# Patient Record
Sex: MALE | Race: BLACK OR AFRICAN AMERICAN | NOT HISPANIC OR LATINO | URBAN - METROPOLITAN AREA
[De-identification: names, ages, dates, MRNs, and addresses within clinical notes are randomized per-mention and may not be internally consistent; named-entity substitution may affect disease eponyms.]

---

## 2019-12-24 ENCOUNTER — EMERGENCY (EMERGENCY)
Facility: HOSPITAL | Age: 59
LOS: 1 days | Discharge: HOME | End: 2019-12-24
Attending: EMERGENCY MEDICINE | Admitting: EMERGENCY MEDICINE
Payer: COMMERCIAL

## 2019-12-24 VITALS
TEMPERATURE: 99 F | SYSTOLIC BLOOD PRESSURE: 175 MMHG | HEART RATE: 74 BPM | DIASTOLIC BLOOD PRESSURE: 97 MMHG | OXYGEN SATURATION: 100 % | RESPIRATION RATE: 16 BRPM

## 2019-12-24 VITALS
OXYGEN SATURATION: 98 % | HEART RATE: 84 BPM | DIASTOLIC BLOOD PRESSURE: 71 MMHG | RESPIRATION RATE: 16 BRPM | TEMPERATURE: 98 F | SYSTOLIC BLOOD PRESSURE: 164 MMHG

## 2019-12-24 DIAGNOSIS — F32.1 MAJOR DEPRESSIVE DISORDER, SINGLE EPISODE, MODERATE: ICD-10-CM

## 2019-12-24 LAB
ALBUMIN SERPL ELPH-MCNC: 4.3 G/DL — SIGNIFICANT CHANGE UP (ref 3.3–5)
ALP SERPL-CCNC: 86 U/L — SIGNIFICANT CHANGE UP (ref 40–120)
ALT FLD-CCNC: 14 U/L — SIGNIFICANT CHANGE UP (ref 4–41)
ANION GAP SERPL CALC-SCNC: 13 MMO/L — SIGNIFICANT CHANGE UP (ref 7–14)
APPEARANCE UR: CLEAR — SIGNIFICANT CHANGE UP
AST SERPL-CCNC: 19 U/L — SIGNIFICANT CHANGE UP (ref 4–40)
BACTERIA # UR AUTO: NEGATIVE — SIGNIFICANT CHANGE UP
BASE EXCESS BLDV CALC-SCNC: 4.3 MMOL/L — SIGNIFICANT CHANGE UP
BASOPHILS # BLD AUTO: 0.05 K/UL — SIGNIFICANT CHANGE UP (ref 0–0.2)
BASOPHILS NFR BLD AUTO: 1.1 % — SIGNIFICANT CHANGE UP (ref 0–2)
BILIRUB SERPL-MCNC: 0.4 MG/DL — SIGNIFICANT CHANGE UP (ref 0.2–1.2)
BILIRUB UR-MCNC: NEGATIVE — SIGNIFICANT CHANGE UP
BLOOD GAS VENOUS - CREATININE: 0.99 MG/DL — SIGNIFICANT CHANGE UP (ref 0.5–1.3)
BLOOD UR QL VISUAL: NEGATIVE — SIGNIFICANT CHANGE UP
BUN SERPL-MCNC: 15 MG/DL — SIGNIFICANT CHANGE UP (ref 7–23)
CALCIUM SERPL-MCNC: 9.3 MG/DL — SIGNIFICANT CHANGE UP (ref 8.4–10.5)
CHLORIDE BLDV-SCNC: 99 MMOL/L — SIGNIFICANT CHANGE UP (ref 96–108)
CHLORIDE SERPL-SCNC: 98 MMOL/L — SIGNIFICANT CHANGE UP (ref 98–107)
CO2 SERPL-SCNC: 23 MMOL/L — SIGNIFICANT CHANGE UP (ref 22–31)
COLOR SPEC: SIGNIFICANT CHANGE UP
CREAT SERPL-MCNC: 1.04 MG/DL — SIGNIFICANT CHANGE UP (ref 0.5–1.3)
EOSINOPHIL # BLD AUTO: 0.17 K/UL — SIGNIFICANT CHANGE UP (ref 0–0.5)
EOSINOPHIL NFR BLD AUTO: 3.8 % — SIGNIFICANT CHANGE UP (ref 0–6)
GAS PNL BLDV: 135 MMOL/L — LOW (ref 136–146)
GLUCOSE BLDV-MCNC: 202 MG/DL — HIGH (ref 70–99)
GLUCOSE SERPL-MCNC: 206 MG/DL — HIGH (ref 70–99)
GLUCOSE UR-MCNC: 300 — HIGH
HCO3 BLDV-SCNC: 26 MMOL/L — SIGNIFICANT CHANGE UP (ref 20–27)
HCT VFR BLD CALC: 45.6 % — SIGNIFICANT CHANGE UP (ref 39–50)
HCT VFR BLDV CALC: 44.9 % — SIGNIFICANT CHANGE UP (ref 39–51)
HGB BLD-MCNC: 14.4 G/DL — SIGNIFICANT CHANGE UP (ref 13–17)
HGB BLDV-MCNC: 14.7 G/DL — SIGNIFICANT CHANGE UP (ref 13–17)
HYALINE CASTS # UR AUTO: NEGATIVE — SIGNIFICANT CHANGE UP
IMM GRANULOCYTES NFR BLD AUTO: 0.2 % — SIGNIFICANT CHANGE UP (ref 0–1.5)
KETONES UR-MCNC: SIGNIFICANT CHANGE UP
LACTATE BLDV-MCNC: 1.8 MMOL/L — SIGNIFICANT CHANGE UP (ref 0.5–2)
LEUKOCYTE ESTERASE UR-ACNC: NEGATIVE — SIGNIFICANT CHANGE UP
LYMPHOCYTES # BLD AUTO: 0.8 K/UL — LOW (ref 1–3.3)
LYMPHOCYTES # BLD AUTO: 17.9 % — SIGNIFICANT CHANGE UP (ref 13–44)
MCHC RBC-ENTMCNC: 23.7 PG — LOW (ref 27–34)
MCHC RBC-ENTMCNC: 31.6 % — LOW (ref 32–36)
MCV RBC AUTO: 75.1 FL — LOW (ref 80–100)
MONOCYTES # BLD AUTO: 0.52 K/UL — SIGNIFICANT CHANGE UP (ref 0–0.9)
MONOCYTES NFR BLD AUTO: 11.7 % — SIGNIFICANT CHANGE UP (ref 2–14)
NEUTROPHILS # BLD AUTO: 2.91 K/UL — SIGNIFICANT CHANGE UP (ref 1.8–7.4)
NEUTROPHILS NFR BLD AUTO: 65.3 % — SIGNIFICANT CHANGE UP (ref 43–77)
NITRITE UR-MCNC: NEGATIVE — SIGNIFICANT CHANGE UP
NRBC # FLD: 0 K/UL — SIGNIFICANT CHANGE UP (ref 0–0)
PCO2 BLDV: 50 MMHG — SIGNIFICANT CHANGE UP (ref 41–51)
PH BLDV: 7.38 PH — SIGNIFICANT CHANGE UP (ref 7.32–7.43)
PH UR: 6 — SIGNIFICANT CHANGE UP (ref 5–8)
PLATELET # BLD AUTO: 243 K/UL — SIGNIFICANT CHANGE UP (ref 150–400)
PMV BLD: 10.1 FL — SIGNIFICANT CHANGE UP (ref 7–13)
PO2 BLDV: 25 MMHG — LOW (ref 35–40)
POTASSIUM BLDV-SCNC: 4 MMOL/L — SIGNIFICANT CHANGE UP (ref 3.4–4.5)
POTASSIUM SERPL-MCNC: 4.2 MMOL/L — SIGNIFICANT CHANGE UP (ref 3.5–5.3)
POTASSIUM SERPL-SCNC: 4.2 MMOL/L — SIGNIFICANT CHANGE UP (ref 3.5–5.3)
PROT SERPL-MCNC: 7.3 G/DL — SIGNIFICANT CHANGE UP (ref 6–8.3)
PROT UR-MCNC: 100 — HIGH
RBC # BLD: 6.07 M/UL — HIGH (ref 4.2–5.8)
RBC # FLD: 13.8 % — SIGNIFICANT CHANGE UP (ref 10.3–14.5)
RBC CASTS # UR COMP ASSIST: SIGNIFICANT CHANGE UP (ref 0–?)
SAO2 % BLDV: 38.8 % — LOW (ref 60–85)
SODIUM SERPL-SCNC: 134 MMOL/L — LOW (ref 135–145)
SP GR SPEC: 1.02 — SIGNIFICANT CHANGE UP (ref 1–1.04)
SQUAMOUS # UR AUTO: SIGNIFICANT CHANGE UP
UROBILINOGEN FLD QL: NORMAL — SIGNIFICANT CHANGE UP
WBC # BLD: 4.46 K/UL — SIGNIFICANT CHANGE UP (ref 3.8–10.5)
WBC # FLD AUTO: 4.46 K/UL — SIGNIFICANT CHANGE UP (ref 3.8–10.5)
WBC UR QL: SIGNIFICANT CHANGE UP (ref 0–?)

## 2019-12-24 PROCEDURE — 71046 X-RAY EXAM CHEST 2 VIEWS: CPT | Mod: 26

## 2019-12-24 PROCEDURE — 99283 EMERGENCY DEPT VISIT LOW MDM: CPT

## 2019-12-24 PROCEDURE — 90792 PSYCH DIAG EVAL W/MED SRVCS: CPT

## 2019-12-24 PROCEDURE — 70450 CT HEAD/BRAIN W/O DYE: CPT | Mod: 26

## 2019-12-24 NOTE — ED BEHAVIORAL HEALTH ASSESSMENT NOTE - RISK ASSESSMENT
Protective factors include no suicide attempts, no violence history, medication compliance, no access to guns, no substance abuse, supportive family, willingness to seek help, no suicidal ideation or homicidal ideation, identifies reasons for living.   Risk factors include family history of suicide, family history of severe mental illness.  Pt is not at acutely elevated risk of harm to self or others. Low Acute Suicide Risk

## 2019-12-24 NOTE — ED BEHAVIORAL HEALTH ASSESSMENT NOTE - DETAILS
none sister committed suicide in 2013; one of his brothers lives on [place] [address] informed wife and brother

## 2019-12-24 NOTE — ED PROVIDER NOTE - ATTENDING CONTRIBUTION TO CARE
agree with resident note    "60 yo m pmh hld, niddm, pw ams. pw wife who provides collateral. states over last few days pt has not seemed himself and has been "lethargic". reports today pt was supposed to get dressed for event however took 2 hours and then came down still in robe unaware of having to go anywhere or get dressed. pt reports he does not "feel well". unable to quantify beyond that. reports dysuria. denies f/c, n/v, ha, trauma."  On further questioning wife states pt recently voluntarily retired from [employer], is normally vigorous.  Now sleeps all day, no pleasure in anything    PE: depressed; low amplitude of voice with 1 word answers; VSS; CTAB/L; s1 s2 no m/r/g abd soft/NT/ND ext: no edema Neuro: CNs intact 5/5 motor UE and LE; sensation intact    Imp: depression will r/o medical causes with labs and head CT given abrupt nature; if negative consult psychiatry

## 2019-12-24 NOTE — ED BEHAVIORAL HEALTH ASSESSMENT NOTE - SUMMARY
60 y/o male, , former [employer] employee, retired in Sep 2019, no formal psych history, no past psych hospitalizations, no h/o outpatient treatment, no h/o self-injurious behavior or suicide attempts, no h/o violence or legal issues, PMH Type II Diabetes, HTN, no h/o substance abuse, brought in by wife and brother for depression.  Patient denies any suicidal ideation, intent, or plan. He is not manic or psychotic. He does not present an acute danger to self or others and does not meet criteria for involuntary psychiatric admission at this time. Pt declines voluntary psychiatric admission at this time.

## 2019-12-24 NOTE — ED ADULT TRIAGE NOTE - CHIEF COMPLAINT QUOTE
Pt family states that he has not been "acting right" x 3 days, pt family states that pt walking and talking slowly.  Pt offers no complaint.  Past medical history: HTN, HLD  FS in EMS

## 2019-12-24 NOTE — ED BEHAVIORAL HEALTH ASSESSMENT NOTE - DESCRIPTION
calm, cooperative, in good behavioral control    Vital Signs Last 24 Hrs  T(C): 37.2 (24 Dec 2019 12:40), Max: 37.2 (24 Dec 2019 12:40)  T(F): 99 (24 Dec 2019 12:40), Max: 99 (24 Dec 2019 12:40)  HR: 74 (24 Dec 2019 12:40) (74 - 84)  BP: 175/97 (24 Dec 2019 12:40) (164/71 - 175/97)  BP(mean): --  RR: 16 (24 Dec 2019 12:40) (16 - 16)  SpO2: 100% (24 Dec 2019 12:40) (98% - 100%) Type II Diabetes, HTN see HPI

## 2019-12-24 NOTE — ED PROVIDER NOTE - NSFOLLOWUPINSTRUCTIONS_ED_ALL_ED_FT
1) Please follow up with your Primary Care Provider in 24-48 hours  2) Seek immediate medical care for any new or returning symptoms including but not limited high fevers, difficulty breathing  3) Please follow up at Crisis Center

## 2019-12-24 NOTE — ED BEHAVIORAL HEALTH ASSESSMENT NOTE - SUICIDE PROTECTIVE FACTORS
Identifies reasons for living/Has future plans/Responsibility to family and others/Supportive social network of family or friends/Ability to cope with stress

## 2019-12-24 NOTE — ED BEHAVIORAL HEALTH ASSESSMENT NOTE - HPI (INCLUDE ILLNESS QUALITY, SEVERITY, DURATION, TIMING, CONTEXT, MODIFYING FACTORS, ASSOCIATED SIGNS AND SYMPTOMS)
60 y/o male, , former [employer] employee, retired in Sep 2019, no formal psych history, no past psych hospitalizations, no h/o outpatient treatment, no h/o self-injurious behavior or suicide attempts, no h/o violence or legal issues, PMH Type II Diabetes, HTN, no h/o substance abuse, brought in by wife and brother for depression.    On assessment, pt reports feeling depressed for the past few months. He cites assisted and his brother's mental illness as stressors. Pt states his brother resides on [place] grounds. States brother's illness "weighs on me" more now that he isn't distracted by work. Pt feels helpless, wishes he could do more for his brother.   Pt reports more difficulty sleeping, slightly reduced appetite, lower energy. He denies difficulty concentrating. Denies anhedonia, stating he enjoys watching sports. He denies hopelessness. He denies suicidal ideation, intent, or plan. He denies manic or psychotic symptoms. He denies homicidal or violent ideation, intent, or plan. He reports having 2-3 alcoholic drinks per week. States he last drank a few days ago. He denies drug use.   See  note for collateral.

## 2019-12-24 NOTE — ED PROVIDER NOTE - CLINICAL SUMMARY MEDICAL DECISION MAKING FREE TEXT BOX
58 yo m pw 3day hx ams. pt unable to recall three words within a few minutes. will check rectal temperature. cxr. labs. cth. reassess.

## 2019-12-24 NOTE — ED BEHAVIORAL HEALTH ASSESSMENT NOTE - SUICIDE RISK FACTORS
Family History of psychiatric diagnoses requiring hospitalization/Insomnia/Current mood episode/Family history of suicide

## 2019-12-24 NOTE — ED BEHAVIORAL HEALTH NOTE - BEHAVIORAL HEALTH NOTE
Worker met with pt, pt's wife, Rika Swartz) [phone], and pt's brother, Jame, in the Main ED to obtain collateral.      Pt is a 59-year-old male BIB his wife and brother, Jame, who is very close with.  Pt domiciles with his wife and several of their adopted children.  Pt retired from the [employer] in 2019 and approximately a month after retiring pt began spending most of the day in bed, often not getting out of bed until 3p or 4p.  Pt is eating daily but not like he normally does.  Pt maintains proper daily hygeine.  Pt reports some trouble falling asleep but that once he does he does not want to get out of bed when he wakes up.  Pt and pt's wife stated pt was very much looking forward to prison but that it is not what he expected and he feels very down.  Pt's brother stated they have another brother and had 3 sisters.  One of the sisters  by suicide in  following a long bout of depression.  Pt's brother, Samir, lives at [facility] after losing his job with [employer] and is severely depressed.  Pt's wife stated that the brother residing at [place] is so depressed he won't come out to see them when they drive there to visit.  Pt and his wife lost 2 of their children, one when she was 8-years old and one when she was 35-year old.  Pt's mother  of cancer within a week of his 8 year-old daughter's death.    Pt reports no SI or plan current or previously, no HI, no AH or VH.  Pt and family stated there are no weapons in their home.  Pt's face lit up when asked what his sports teams are and he stated the [employer].  Pt is a diabetic, non insulin dependent, he takes metformin and a medication for high cholesterol that he could not remember the name of.  Pt has never received a psychiatric evaluation or diagnosis, and will begin seeing a new primary care physician in the next few months as his previous PCP retired.  Pt stated he has felt down like this a few times in his life but not like this or this bad.  Pt reported the holidays have an effect on his mood but that it's primarily been due to prison. Worker met with pt, pt's wife, Rika Swartz) [phone], and pt's brother, Jame, in the Main ED to obtain collateral.      Pt is a 59-year-old male BIB EMS with his wife and brother, Jame, who is very close with.  Pt domiciles with his wife and several of their adopted children.  Pt retired from the [employer] in 2019 and approximately a month after retiring pt began spending most of the day in bed, often not getting out of bed until 3p or 4p.  Pt is eating daily but not like he normally does.  Pt maintains proper daily hygeine.  Pt reports some trouble falling asleep but that once he does he does not want to get out of bed when he wakes up.  Pt and pt's wife stated pt was very much looking forward to alf but that it is not what he expected and he feels very down.  Pt's brother stated they have another brother and had 3 sisters.  One of the sisters  by suicide in  following a long bout of depression.  Pt's brother, Samir, lives at [facility] after losing his job with [employer] and is severely depressed.  Pt's wife stated that the brother residing at [place] is so depressed he won't come out to see them when they drive there to visit.  Pt and his wife lost 2 of their children, one when she was 8-years old and one when she was 35-year old.  Pt's mother  of cancer within a week of his 8 year-old daughter's death.    Pt reports no SI or plan current or previously, no HI, no AH or VH.  Pt and family stated there are no weapons in their home.  Pt's face lit up when asked what his sports teams are and he stated the [employer].  Pt is a diabetic, non insulin dependent, he takes metformin and a medication for high cholesterol that he could not remember the name of.  Pt has never received a psychiatric evaluation or diagnosis, and will begin seeing a new primary care physician in the next few months as his previous PCP retired.  Pt stated he has felt down like this a few times in his life but not like this or this bad.  Pt reported the holidays have an effect on his mood but that it's primarily been due to alf.

## 2019-12-24 NOTE — ED ADULT NURSE NOTE - OBJECTIVE STATEMENT
Pt rec'd to ED R#19, brought in by wife for AMS. Pt states "I just don't feel like myself." Wife states he is not acting appropriately, seems lethargic. Of note, pt retired 4 months ago. As per wife patient has not been active, has been staying in bed all day. Pt appears withdrawn with flat affect. Denies SI/HI, however says he does feel "down." Denies any toxic habits including illicit drug use/ ETOH/ cigarette smoking. Denies any medical complaints including CP/ SOB/ fevers/ chills/ N/V/D/ dizziness/ other acute complaints. Is in NAD, respirations even and unlabored, VSS. EKG completed upon arrival to room. IV inserted, BW collected and sent to lab. Bed in lowest locked position, call bell within reach. Wife at bedside.

## 2019-12-24 NOTE — ED PROVIDER NOTE - NS ED ROS FT
GENERAL: No fever or chills, //             EYES: no change in vision, //             HEENT: no trouble swallowing or speaking, //             CARDIAC: no chest pain, //              PULMONARY: no cough or SOB, //             GI: no abdominal pain, no nausea or no vomiting, no diarrhea or constipation, //             : dysuria ,  //            SKIN: no rashes,  //            NEURO: no headache,  //             MSK: No joint pain otherwise as HPI or negative. ~Bhupinder Schneider DO PGY2

## 2019-12-24 NOTE — ED PROVIDER NOTE - PATIENT PORTAL LINK FT
no You can access the FollowMyHealth Patient Portal offered by [facility] by registering at the following website: [email or web address]. By joining [employer]’s FollowMyHealth portal, you will also be able to view your health information using other applications (apps) compatible with our system.

## 2019-12-24 NOTE — ED BEHAVIORAL HEALTH ASSESSMENT NOTE - OTHER
wife and brother did not assess dysphoric overall constricted but affect brightens when talking about sports see HPI

## 2019-12-24 NOTE — ED PROVIDER NOTE - OBJECTIVE STATEMENT
60 yo m pmh hld, niddm, pw ams. pw wife who provides collateral. states over last few days pt has not seemed himself and has been "lethargic". reports today pt was supposed to get dressed for event however took 2 hours and then came down still in robe unaware of having to go anywhere or get dressed. pt reports he does not "feel well". unable to quantify beyond that. reports dysuria. denies f/c, n/v, ha, trauma.

## 2019-12-26 LAB
BACTERIA UR CULT: SIGNIFICANT CHANGE UP
SPECIMEN SOURCE: SIGNIFICANT CHANGE UP

## 2019-12-29 ENCOUNTER — EMERGENCY (EMERGENCY)
Facility: HOSPITAL | Age: 59
LOS: 1 days | Discharge: HOME | End: 2019-12-29
Admitting: EMERGENCY MEDICINE
Payer: COMMERCIAL

## 2019-12-29 VITALS
HEART RATE: 81 BPM | RESPIRATION RATE: 16 BRPM | SYSTOLIC BLOOD PRESSURE: 163 MMHG | TEMPERATURE: 99 F | DIASTOLIC BLOOD PRESSURE: 99 MMHG | OXYGEN SATURATION: 100 %

## 2019-12-29 VITALS
SYSTOLIC BLOOD PRESSURE: 155 MMHG | DIASTOLIC BLOOD PRESSURE: 112 MMHG | TEMPERATURE: 99 F | OXYGEN SATURATION: 97 % | RESPIRATION RATE: 18 BRPM | HEART RATE: 89 BPM

## 2019-12-29 LAB
ALBUMIN SERPL ELPH-MCNC: 4.8 G/DL — SIGNIFICANT CHANGE UP (ref 3.3–5)
ALP SERPL-CCNC: 96 U/L — SIGNIFICANT CHANGE UP (ref 40–120)
ALT FLD-CCNC: 16 U/L — SIGNIFICANT CHANGE UP (ref 4–41)
AMPHET UR-MCNC: NEGATIVE — SIGNIFICANT CHANGE UP
ANION GAP SERPL CALC-SCNC: 16 MMO/L — HIGH (ref 7–14)
APAP SERPL-MCNC: < 15 UG/ML — LOW (ref 15–25)
APPEARANCE UR: CLEAR — SIGNIFICANT CHANGE UP
AST SERPL-CCNC: 15 U/L — SIGNIFICANT CHANGE UP (ref 4–40)
BACTERIA # UR AUTO: NEGATIVE — SIGNIFICANT CHANGE UP
BARBITURATES UR SCN-MCNC: NEGATIVE — SIGNIFICANT CHANGE UP
BASOPHILS # BLD AUTO: 0.08 K/UL — SIGNIFICANT CHANGE UP (ref 0–0.2)
BASOPHILS NFR BLD AUTO: 1.7 % — SIGNIFICANT CHANGE UP (ref 0–2)
BENZODIAZ UR-MCNC: NEGATIVE — SIGNIFICANT CHANGE UP
BILIRUB SERPL-MCNC: 0.4 MG/DL — SIGNIFICANT CHANGE UP (ref 0.2–1.2)
BILIRUB UR-MCNC: NEGATIVE — SIGNIFICANT CHANGE UP
BLOOD UR QL VISUAL: NEGATIVE — SIGNIFICANT CHANGE UP
BUN SERPL-MCNC: 22 MG/DL — SIGNIFICANT CHANGE UP (ref 7–23)
CALCIUM SERPL-MCNC: 9.7 MG/DL — SIGNIFICANT CHANGE UP (ref 8.4–10.5)
CANNABINOIDS UR-MCNC: NEGATIVE — SIGNIFICANT CHANGE UP
CHLORIDE SERPL-SCNC: 97 MMOL/L — LOW (ref 98–107)
CO2 SERPL-SCNC: 25 MMOL/L — SIGNIFICANT CHANGE UP (ref 22–31)
COCAINE METAB.OTHER UR-MCNC: NEGATIVE — SIGNIFICANT CHANGE UP
COLOR SPEC: YELLOW — SIGNIFICANT CHANGE UP
CREAT SERPL-MCNC: 1.1 MG/DL — SIGNIFICANT CHANGE UP (ref 0.5–1.3)
EOSINOPHIL # BLD AUTO: 0.23 K/UL — SIGNIFICANT CHANGE UP (ref 0–0.5)
EOSINOPHIL NFR BLD AUTO: 5 % — SIGNIFICANT CHANGE UP (ref 0–6)
ETHANOL BLD-MCNC: < 10 MG/DL — SIGNIFICANT CHANGE UP
GLUCOSE SERPL-MCNC: 156 MG/DL — HIGH (ref 70–99)
GLUCOSE UR-MCNC: NEGATIVE — SIGNIFICANT CHANGE UP
HCT VFR BLD CALC: 47.2 % — SIGNIFICANT CHANGE UP (ref 39–50)
HGB BLD-MCNC: 15.2 G/DL — SIGNIFICANT CHANGE UP (ref 13–17)
HYALINE CASTS # UR AUTO: NEGATIVE — SIGNIFICANT CHANGE UP
IMM GRANULOCYTES NFR BLD AUTO: 0.2 % — SIGNIFICANT CHANGE UP (ref 0–1.5)
KETONES UR-MCNC: SIGNIFICANT CHANGE UP
LEUKOCYTE ESTERASE UR-ACNC: NEGATIVE — SIGNIFICANT CHANGE UP
LYMPHOCYTES # BLD AUTO: 1.14 K/UL — SIGNIFICANT CHANGE UP (ref 1–3.3)
LYMPHOCYTES # BLD AUTO: 24.9 % — SIGNIFICANT CHANGE UP (ref 13–44)
MCHC RBC-ENTMCNC: 24.1 PG — LOW (ref 27–34)
MCHC RBC-ENTMCNC: 32.2 % — SIGNIFICANT CHANGE UP (ref 32–36)
MCV RBC AUTO: 74.9 FL — LOW (ref 80–100)
METHADONE UR-MCNC: NEGATIVE — SIGNIFICANT CHANGE UP
MONOCYTES # BLD AUTO: 0.49 K/UL — SIGNIFICANT CHANGE UP (ref 0–0.9)
MONOCYTES NFR BLD AUTO: 10.7 % — SIGNIFICANT CHANGE UP (ref 2–14)
NEUTROPHILS # BLD AUTO: 2.63 K/UL — SIGNIFICANT CHANGE UP (ref 1.8–7.4)
NEUTROPHILS NFR BLD AUTO: 57.5 % — SIGNIFICANT CHANGE UP (ref 43–77)
NITRITE UR-MCNC: NEGATIVE — SIGNIFICANT CHANGE UP
NRBC # FLD: 0 K/UL — SIGNIFICANT CHANGE UP (ref 0–0)
OPIATES UR-MCNC: NEGATIVE — SIGNIFICANT CHANGE UP
OXYCODONE UR-MCNC: NEGATIVE — SIGNIFICANT CHANGE UP
PCP UR-MCNC: NEGATIVE — SIGNIFICANT CHANGE UP
PH UR: 6 — SIGNIFICANT CHANGE UP (ref 5–8)
PLATELET # BLD AUTO: 234 K/UL — SIGNIFICANT CHANGE UP (ref 150–400)
PMV BLD: 10.3 FL — SIGNIFICANT CHANGE UP (ref 7–13)
POTASSIUM SERPL-MCNC: 4.3 MMOL/L — SIGNIFICANT CHANGE UP (ref 3.5–5.3)
POTASSIUM SERPL-SCNC: 4.3 MMOL/L — SIGNIFICANT CHANGE UP (ref 3.5–5.3)
PROT SERPL-MCNC: 7.6 G/DL — SIGNIFICANT CHANGE UP (ref 6–8.3)
PROT UR-MCNC: 200 — HIGH
RBC # BLD: 6.3 M/UL — HIGH (ref 4.2–5.8)
RBC # FLD: 13.5 % — SIGNIFICANT CHANGE UP (ref 10.3–14.5)
RBC CASTS # UR COMP ASSIST: SIGNIFICANT CHANGE UP (ref 0–?)
SALICYLATES SERPL-MCNC: < 5 MG/DL — LOW (ref 15–30)
SODIUM SERPL-SCNC: 138 MMOL/L — SIGNIFICANT CHANGE UP (ref 135–145)
SP GR SPEC: 1.03 — SIGNIFICANT CHANGE UP (ref 1–1.04)
SQUAMOUS # UR AUTO: SIGNIFICANT CHANGE UP
TSH SERPL-MCNC: 0.89 UIU/ML — SIGNIFICANT CHANGE UP (ref 0.27–4.2)
UROBILINOGEN FLD QL: NORMAL — SIGNIFICANT CHANGE UP
WBC # BLD: 4.58 K/UL — SIGNIFICANT CHANGE UP (ref 3.8–10.5)
WBC # FLD AUTO: 4.58 K/UL — SIGNIFICANT CHANGE UP (ref 3.8–10.5)
WBC UR QL: HIGH (ref 0–?)

## 2019-12-29 PROCEDURE — 90792 PSYCH DIAG EVAL W/MED SRVCS: CPT | Mod: GC

## 2019-12-29 PROCEDURE — 99284 EMERGENCY DEPT VISIT MOD MDM: CPT

## 2019-12-29 RX ORDER — HYDROXYZINE HCL 10 MG
1 TABLET ORAL
Qty: 21 | Refills: 0
Start: 2019-12-29 | End: 2020-01-04

## 2019-12-29 NOTE — ED ADULT NURSE REASSESSMENT NOTE - NS ED NURSE REASSESS COMMENT FT1
pt calm lying on stretcher in nad denies si/hi/avh presently, pt awaiting bed assignment safety & precaution maintained eval on going.

## 2019-12-29 NOTE — ED BEHAVIORAL HEALTH ASSESSMENT NOTE - OTHER
wife and brother did not assess dysphoric overall constricted but affect brightens when talking about sports see HPI guarded psychomotor retardation

## 2019-12-29 NOTE — ED BEHAVIORAL HEALTH ASSESSMENT NOTE - DESCRIPTION
calm, cooperative, in good behavioral control    Vital Signs Last 24 Hrs  T(C): 37.2 (24 Dec 2019 12:40), Max: 37.2 (24 Dec 2019 12:40)  T(F): 99 (24 Dec 2019 12:40), Max: 99 (24 Dec 2019 12:40)  HR: 74 (24 Dec 2019 12:40) (74 - 84)  BP: 175/97 (24 Dec 2019 12:40) (164/71 - 175/97)  BP(mean): --  RR: 16 (24 Dec 2019 12:40) (16 - 16)  SpO2: 100% (24 Dec 2019 12:40) (98% - 100%) Type II Diabetes, HTN see HPI calm, cooperative but guarded, in good behavioral control    T(C): 37.1 (12-29-19 @ 16:40), Max: 37.1 (12-29-19 @ 12:19)  HR: 81 (12-29-19 @ 16:40) (81 - 89)  BP: 163/99 (12-29-19 @ 16:40) (155/112 - 163/99)  RR: 16 (12-29-19 @ 16:40) (16 - 18)  SpO2: 100% (12-29-19 @ 16:40) (97% - 100%)  Wt(kg): --

## 2019-12-29 NOTE — ED BEHAVIORAL HEALTH ASSESSMENT NOTE - SAFETY PLAN DETAILS
call 911 or return to ED if symptoms worsen or if pt develops thoughts of harming self or others call 911 or return to ED/crisis center if symptoms worsen or if pt develops thoughts of harming self or others

## 2019-12-29 NOTE — ED PROVIDER NOTE - PATIENT PORTAL LINK FT
You can access the FollowMyHealth Patient Portal offered by [facility] by registering at the following website: [email or web address]. By joining [employer]’s FollowMyHealth portal, you will also be able to view your health information using other applications (apps) compatible with our system.

## 2019-12-29 NOTE — ED BEHAVIORAL HEALTH ASSESSMENT NOTE - CASE SUMMARY
59/M with no established past psych hx, no prior psych hosps, no hx of SA or self injurious behavior and no substance abuse hx.  Last seen at the [facility] ED x 5 days ago due to depression.  Subsequently discharged during that visit and referred to Crisis Center.  Today, came back for the same complaint accompanied by family.  Pt endorses feeling depression for the past few months.  This has been accompanied by early insomnia, with appetite disturbances (no consequential wt loss), low energy level and guilty ruminations (but would not expound when attempt to explore on it was made).  Pt denied anhedonia; denied feeling hopelessness/helpless/worthless. Collateral information was obtained from the Pt's wife and daughter who both reported that Pt has been feeling depressed with anxiety since retiring from his job at the [employer] since 9/2019.  This has been compounded by other stressors like his brother's severe depression.  Pt has recently revealed "something to his wife" that made him feel guilty - regarding not disclosing to her earlier about the event that transpired in his life.  Since his  ED arrival, the Pt has been calm and cooperative.  There has been no agitation/aggressive behavior.  No verbalization of active/ passive SI/HI.   There are no signs/symptoms of acute vernon or florid psychosis.  The Pt is not experiencing any AH/VH.  Pt is not showing any signs/symptoms of intoxication or withdrawal.  He has not tested limits.. Has maintained appropriate boundaries. Pt has been easily redirected.  Overall, there has been no management issues.  Family did not express any safety concerns.  They are ok with Pt being discharged and will follow up as OP basis.      RECOMMENDATIONS:   1. Psychoeducation provided.  May give atarax 10mg q6-q8hrs PRN for anxiety/agitation.  Otherwise, discussed role of starting an antidepressant like an SSRI should symptoms worsen as well as exploring on prospect of Pt engaging in psychotherapy.  Pt expressed that he is open to this once he is able to establish an out Pt psychiatry clinic follow up.    2. Emergency protocol reviewed.  Pt and family were both adviced to call 911 or come to the nearest ED should symptoms worsen; have increasing bouts of agitation/ aggressive behavior; having SI/HI.

## 2019-12-29 NOTE — ED BEHAVIORAL HEALTH ASSESSMENT NOTE - MODIFICATIONS
I have examined and evaluated the patient with Dr BOOGIE Blackwell and performed alexis elements of the History and Mental Status Examination.  I agree with her assessment and recommendations.

## 2019-12-29 NOTE — ED PROVIDER NOTE - CLINICAL SUMMARY MEDICAL DECISION MAKING FREE TEXT BOX
Labs, Urine Tox/UA, EKG  Medical evaluation performed. There is no clinical evidence of intoxication or any acute medical problem requiring immediate intervention. Patient is awaiting psychiatric consultation. Final disposition will be determined by psychiatrist. 58 y/o M hx DM, Depression   Labs, Urine Tox/UA, EKG  Medical evaluation performed. There is no clinical evidence of intoxication or any acute medical problem requiring immediate intervention. Patient is awaiting psychiatric consultation. Final disposition will be determined by psychiatrist.

## 2019-12-29 NOTE — ED BEHAVIORAL HEALTH ASSESSMENT NOTE - SUICIDE RISK FACTORS
Current mood episode/Family history of suicide/Family History of psychiatric diagnoses requiring hospitalization/Insomnia

## 2019-12-29 NOTE — ED BEHAVIORAL HEALTH ASSESSMENT NOTE - SUICIDE PROTECTIVE FACTORS
Has future plans/Responsibility to family and others/Ability to cope with stress/Identifies reasons for living/Supportive social network of family or friends

## 2019-12-29 NOTE — ED BEHAVIORAL HEALTH ASSESSMENT NOTE - DETAILS
none sister committed suicide in 2013; one of his brothers lives on [place] grounds informed wife and brother sister committed suicide in 2013; one of his brothers lives on [place] grounds and has severe depression

## 2019-12-29 NOTE — ED PROVIDER NOTE - OBJECTIVE STATEMENT
60 y/o M hx DM, Depression BIB family w c/o  worsening depression .  Patient admits to feeling sad.  Explicitly denies SI/HI/AH/VH. Denies falling,  punching, or kicking any objects.   Denies pain, SOB, fever, chills,  chest/abdominal discomfort. Denies use of  alcohol or  illicit drugs. No evidence of physical injuries, broken  skin or deformities.

## 2019-12-29 NOTE — ED BEHAVIORAL HEALTH ASSESSMENT NOTE - SUMMARY
58 y/o male, , former [employer] employee, retired in Sep 2019, no formal psych history, no past psych hospitalizations, no h/o outpatient treatment, no h/o self-injurious behavior or suicide attempts, no h/o violence or legal issues, PMH Type II Diabetes, HTN, no h/o substance abuse, brought in by wife and brother for depression.  Patient denies any suicidal ideation, intent, or plan. He is not manic or psychotic. He does not present an acute danger to self or others and does not meet criteria for involuntary psychiatric admission at this time. Pt declines voluntary psychiatric admission at this time. 60 y/o male, , former [employer] employee, retired in Sep 2019, no formal psych history, no past psych hospitalizations, no h/o outpatient treatment, no h/o self-injurious behavior or suicide attempts, no h/o violence or legal issues, PMH Type II Diabetes, HTN, no h/o substance abuse, came to ED requesting psych eval, accompanied by wife/daughter. Of note, pt was seen in ED 12/24/19 for similar complaint.    Pt had panic attack today in the context of depression. No safety concerns from pt or family. Pt plans to engage in outpt tx.  Patient denies any suicidal ideation, intent, or plan. He is not manic or psychotic. He does not present an acute danger to self or others and does not meet criteria for involuntary psychiatric admission at this time. Pt declines voluntary psychiatric admission at this time. 58 y/o male, , former [employer] employee, retired in Sep 2019, no formal psych history, no past psych hospitalizations, no h/o outpatient treatment, no h/o self-injurious behavior or suicide attempts, no h/o violence or legal issues, PMH Type II Diabetes, HTN, no h/o substance abuse, came to ED requesting psych eval, accompanied by wife/daughter. Of note, pt was seen in ED 12/24/19 for similar complaint.    Pt had panic attack today in the context of depression. No safety concerns from pt or family. Pt plans to engage in outpt tx.  Patient denies any suicidal ideation, intent, or plan. He is not manic or psychotic. He does not present an acute danger to self or others and does not meet criteria for psychiatric admission at this time.

## 2019-12-29 NOTE — ED ADULT NURSE NOTE - NSIMPLEMENTINTERV_GEN_ALL_ED
Implemented All Universal Safety Interventions:  [place] to call system. Call bell, personal items and telephone within reach. Instruct patient to call for assistance. Room bathroom lighting operational. Non-slip footwear when patient is off stretcher. Physically safe environment: no spills, clutter or unnecessary equipment. Stretcher in lowest position, wheels locked, appropriate side rails in place.

## 2019-12-29 NOTE — ED ADULT TRIAGE NOTE - CHIEF COMPLAINT QUOTE
Pt states he feels like he is having a "nervous breakdown", denies SI/HI. Pt hesitant to answer questions, family states pt is refusing to talk at times. PMH htn, dm hld

## 2019-12-29 NOTE — ED BEHAVIORAL HEALTH ASSESSMENT NOTE - RISK ASSESSMENT
Protective factors include no suicide attempts, no violence history, medication compliance, no access to guns, no substance abuse, supportive family, willingness to seek help, no suicidal ideation or homicidal ideation, identifies reasons for living.   Risk factors include family history of suicide, family history of severe mental illness.  Pt is not at acutely elevated risk of harm to self or others. Low Acute Suicide Risk Protective factors include no suicide attempts, no violence history, medication compliance, no access to guns, no substance abuse, supportive family, willingness to seek help, no suicidal ideation or homicidal ideation, identifies reasons for living.   Risk factors include family history of suicide, family history of severe mental illness, current depression.  Pt is not at acutely elevated risk of harm to self or others.

## 2019-12-29 NOTE — ED PROVIDER NOTE - NSFOLLOWUPCLINICS_GEN_ALL_ED_FT
[facility]  Behavioral Health  [address] [place] [address]  Phone: [phone]  Fax:   Follow Up Time:

## 2019-12-29 NOTE — ED ADULT NURSE REASSESSMENT NOTE - NS ED NURSE REASSESS COMMENT FT1
Evaluated and cleared by psychiatry, MC by NP.  Pt remains awake, calm at baseline mental status. Denies s/i h/i/avh presently,  pt d/c by np  d/c instructions and  resources provided ,  pt verbalizing understanding of d/c instructions.

## 2019-12-29 NOTE — ED BEHAVIORAL HEALTH ASSESSMENT NOTE - HPI (INCLUDE ILLNESS QUALITY, SEVERITY, DURATION, TIMING, CONTEXT, MODIFYING FACTORS, ASSOCIATED SIGNS AND SYMPTOMS)
60 y/o male, , former [employer] employee, retired in Sep 2019, no formal psych history, no past psych hospitalizations, no h/o outpatient treatment, no h/o self-injurious behavior or suicide attempts, no h/o violence or legal issues, PMH Type II Diabetes, HTN, no h/o substance abuse, brought in by wife and brother for depression.    On assessment, pt reports feeling depressed for the past few months. He cites snf and his brother's mental illness as stressors. Pt states his brother resides on [place]. States brother's illness "weighs on me" more now that he isn't distracted by work. Pt feels helpless, wishes he could do more for his brother.   Pt reports more difficulty sleeping, slightly reduced appetite, lower energy. He denies difficulty concentrating. Denies anhedonia, stating he enjoys watching sports. He denies hopelessness. He denies suicidal ideation, intent, or plan. He denies manic or psychotic symptoms. He denies homicidal or violent ideation, intent, or plan. He reports having 2-3 alcoholic drinks per week. States he last drank a few days ago. He denies drug use.   See  note for collateral. 58 y/o male, , former [employer] employee, retired in Sep 2019, no formal psych history, no past psych hospitalizations, no h/o outpatient treatment, no h/o self-injurious behavior or suicide attempts, no h/o violence or legal issues, PMH Type II Diabetes, HTN, no h/o substance abuse, came to ED requesting psych eval, accompanied by wife/daughter. Of note, pt was seen in ED 12/24/19 for similar complaint.    On assessment, pt is guarded and demonstrates latency of speech. He says that he is here today because he was "having a lot of thoughts." Would not elaborate on these thoughts. States that he has been feeling depressed over the past few months, with "up and down sleep, up and down appetite" since last evaluation, low energy, guilty ruminations (would not clarify about what). Denies hopelessness. Denies anhedonia, states he enjoys spending time with his family. Adamantly denies any passive or active suicidal I/I/P. Wants to live for family. Denies any homicidal I/I/P. States that he has some stressors in his life currently, would not specify. Denied AVH, paranoid delusions, IOR.     Spoke with pt's wife and daughter. They state that pt has been depressed with anxious ruminations since retiring in September. Has had stressors including his brother's depression which is severe at this time. He has been more withdrawn, staying home, not doing much. He has been eating and caring for his hygiene. He recently revealed something to his wife that he feels guilty about not telling her about earlier, and he has been ruminating on this. They deny that he has made any suicidal/homicidal statement. They have no safety concerns and want him to return home. 60 y/o male, , former [employer] employee, retired in Sep 2019, no formal psych history, no past psych hospitalizations, no h/o outpatient treatment, no h/o self-injurious behavior or suicide attempts, no h/o violence or legal issues, PMH Type II Diabetes, HTN, no h/o substance abuse, came to ED requesting psych eval, accompanied by wife/daughter. Of note, pt was seen in ED 12/24/19 for similar complaint.    On assessment, pt is guarded and demonstrates latency of speech. He says that he is here today because he was "having a lot of thoughts." Would not elaborate on these thoughts. States that he has been feeling depressed over the past few months, with "up and down sleep, up and down appetite" since last evaluation, low energy, guilty ruminations (would not clarify about what). Denies hopelessness. Denies anhedonia, states he enjoys spending time with his family. Adamantly denies any passive or active suicidal I/I/P. Wants to live for family. Denies any homicidal I/I/P. States that he has some stressors in his life currently, would not specify. Denied AVH, paranoid delusions, IOR. He denies difficulties with iADLs or memory functioning. Denies hx of TBI.    Spoke with pt's wife and daughter. They state that pt has been depressed with anxious ruminations since retiring in September. Has had stressors including his brother's depression which is severe at this time. He has been more withdrawn, staying home, not doing much. He has been eating and caring for his hygiene. He recently revealed something to his wife that he feels guilty about not telling her about earlier, and he has been ruminating on this. They deny that he has made any suicidal/homicidal statement. They have no safety concerns and want him to return home.

## 2019-12-29 NOTE — ED BEHAVIORAL HEALTH ASSESSMENT NOTE - MEDICATIONS (PRESCRIPTIONS, DIRECTIONS)
take medications as prescribed pt provided with 5 day Rx for Atarax PRN anxiety pt provided with 5 day Rx for Atarax 10 mg q6hr PRN panic attack

## 2019-12-29 NOTE — ED ADULT NURSE NOTE - OBJECTIVE STATEMENT
Received pt in  pt c/o depression & si denies hi/avh presently safety & comfort measures maintained eval on going.

## 2019-12-31 NOTE — ED BEHAVIORAL HEALTH NOTE - BEHAVIORAL HEALTH NOTE
Urgent Referral:   writer called and spoke to patient  who states he has a therapist and has an appointment next week.

## 2020-01-13 PROBLEM — Z00.00 ENCOUNTER FOR PREVENTIVE HEALTH EXAMINATION: Status: ACTIVE | Noted: 2020-01-13

## 2020-01-16 ENCOUNTER — APPOINTMENT (OUTPATIENT)
Dept: PSYCHIATRY | Facility: CLINIC | Age: 60
End: 2020-01-16
